# Patient Record
Sex: FEMALE | Race: WHITE
[De-identification: names, ages, dates, MRNs, and addresses within clinical notes are randomized per-mention and may not be internally consistent; named-entity substitution may affect disease eponyms.]

---

## 2018-01-25 ENCOUNTER — HOSPITAL ENCOUNTER (OUTPATIENT)
Dept: HOSPITAL 45 - C.RAD1850 | Age: 73
Discharge: HOME | End: 2018-01-25
Attending: STUDENT IN AN ORGANIZED HEALTH CARE EDUCATION/TRAINING PROGRAM
Payer: COMMERCIAL

## 2018-01-25 DIAGNOSIS — M25.531: Primary | ICD-10-CM

## 2018-01-25 DIAGNOSIS — Z85.820: ICD-10-CM

## 2018-01-25 NOTE — DIAGNOSTIC IMAGING REPORT
RIGHT WRIST 4 VIEWS



HISTORY:      PAIN IN RIGHT WRIST



COMPARISON: None.



FINDINGS: No acute fracture or dislocation within the right wrist. The bones are

osteopenic. There is an old nonunited fracture at the ulnar styloid. Mild

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 Mild soft tissue swelling. No

radiopaque foreign bodies.



IMPRESSION:  

Mild soft tissue swelling within the right wrist. No acute fracture or

dislocation.







Electronically signed by:  Jt Bansal M.D.

1/25/2018 12:44 PM



Dictated Date/Time:  1/25/2018 12:41 PM